# Patient Record
Sex: FEMALE | Race: WHITE | NOT HISPANIC OR LATINO | Employment: OTHER | URBAN - NONMETROPOLITAN AREA
[De-identification: names, ages, dates, MRNs, and addresses within clinical notes are randomized per-mention and may not be internally consistent; named-entity substitution may affect disease eponyms.]

---

## 2023-09-28 ENCOUNTER — TRANSCRIBE ORDERS (OUTPATIENT)
Dept: CARDIOLOGY | Facility: CLINIC | Age: 83
End: 2023-09-28
Payer: MEDICARE

## 2023-09-28 DIAGNOSIS — I44.0 AV BLOCK, 1ST DEGREE: ICD-10-CM

## 2023-09-28 DIAGNOSIS — R94.31 ABNORMAL EKG: ICD-10-CM

## 2023-09-28 PROBLEM — I49.9 IRREGULAR HEARTBEAT: Status: ACTIVE | Noted: 2023-09-28

## 2023-09-28 PROBLEM — E55.9 VITAMIN D DEFICIENCY: Status: ACTIVE | Noted: 2023-09-28

## 2023-09-28 PROBLEM — Z78.9 STATIN INTOLERANCE: Status: ACTIVE | Noted: 2023-09-28

## 2023-09-28 PROBLEM — I44.1 SECOND DEGREE AV BLOCK: Status: ACTIVE | Noted: 2023-09-28

## 2023-09-28 PROBLEM — J38.3 GLOTTIC INSUFFICIENCY: Status: ACTIVE | Noted: 2023-09-28

## 2023-09-28 PROBLEM — I80.8: Status: ACTIVE | Noted: 2023-09-28

## 2023-09-28 PROBLEM — E66.3 OVERWEIGHT WITH BODY MASS INDEX (BMI) OF 26 TO 26.9 IN ADULT: Status: ACTIVE | Noted: 2023-09-28

## 2023-09-28 PROBLEM — Z87.891 FORMER SMOKER: Status: ACTIVE | Noted: 2023-09-28

## 2023-09-28 PROBLEM — I10 ESSENTIAL HYPERTENSION: Status: ACTIVE | Noted: 2023-09-28

## 2023-09-28 PROBLEM — R09.A2 GLOBUS PHARYNGEUS: Status: ACTIVE | Noted: 2023-09-28

## 2023-09-28 PROBLEM — R49.0 MUSCLE TENSION DYSPHONIA: Status: ACTIVE | Noted: 2023-09-28

## 2023-09-28 PROBLEM — Z87.39 HISTORY OF ARTHRITIS: Status: ACTIVE | Noted: 2023-09-28

## 2023-09-28 PROBLEM — E78.5 HYPERLIPIDEMIA: Status: ACTIVE | Noted: 2023-09-28

## 2023-09-28 PROBLEM — I25.10 ATHEROSCLEROSIS OF NATIVE CORONARY ARTERY OF NATIVE HEART WITHOUT ANGINA PECTORIS: Status: ACTIVE | Noted: 2023-09-28

## 2023-09-28 PROBLEM — J38.02 BILATERAL VOCAL CORD PARESIS: Status: ACTIVE | Noted: 2023-09-28

## 2023-09-28 RX ORDER — CARVEDILOL 6.25 MG/1
1 TABLET ORAL 2 TIMES DAILY
COMMUNITY
Start: 2022-08-01 | End: 2023-11-27 | Stop reason: SINTOL

## 2023-09-28 RX ORDER — POTASSIUM CHLORIDE 20 MEQ/1
1 TABLET, EXTENDED RELEASE ORAL DAILY
COMMUNITY

## 2023-09-28 RX ORDER — ACETAMINOPHEN 500 MG
2 TABLET ORAL EVERY 8 HOURS PRN
COMMUNITY
Start: 2022-03-24

## 2023-09-28 RX ORDER — LANOLIN ALCOHOL/MO/W.PET/CERES
1 CREAM (GRAM) TOPICAL DAILY
COMMUNITY

## 2023-09-28 RX ORDER — ACETAMINOPHEN 500 MG
1 TABLET ORAL DAILY
COMMUNITY
End: 2024-03-25 | Stop reason: SDUPTHER

## 2023-09-28 RX ORDER — VITS A,C,E/LUTEIN/MINERALS 300MCG-200
1 TABLET ORAL DAILY
COMMUNITY

## 2023-09-28 RX ORDER — CHOLECALCIFEROL (VITAMIN D3) 50 MCG
1 TABLET ORAL DAILY
COMMUNITY
End: 2023-12-07 | Stop reason: SDUPTHER

## 2023-09-28 RX ORDER — EZETIMIBE 10 MG/1
1 TABLET ORAL NIGHTLY
COMMUNITY
Start: 2022-07-25

## 2023-09-28 RX ORDER — NITROGLYCERIN 0.4 MG/1
1 TABLET SUBLINGUAL EVERY 5 MIN PRN
COMMUNITY

## 2023-09-28 RX ORDER — HYDROCHLOROTHIAZIDE 12.5 MG/1
1 TABLET ORAL EVERY OTHER DAY
COMMUNITY
Start: 2022-10-24 | End: 2024-02-19 | Stop reason: SDUPTHER

## 2023-09-28 RX ORDER — ASPIRIN 81 MG/1
1 TABLET ORAL 3 TIMES WEEKLY
COMMUNITY

## 2023-09-28 RX ORDER — ACETAMINOPHEN 160 MG/5ML
1 SUSPENSION, ORAL (FINAL DOSE FORM) ORAL DAILY
COMMUNITY

## 2023-09-28 RX ORDER — LOPERAMIDE HYDROCHLORIDE 2 MG/1
1 CAPSULE ORAL 4 TIMES DAILY PRN
COMMUNITY

## 2023-09-28 RX ORDER — CALCIUM CARB, CITRATE, MALATE 250 MG
1 CAPSULE ORAL DAILY
COMMUNITY
End: 2024-03-25

## 2023-09-28 RX ORDER — AMLODIPINE AND BENAZEPRIL HYDROCHLORIDE 5; 20 MG/1; MG/1
1 CAPSULE ORAL DAILY
COMMUNITY
End: 2023-11-29

## 2023-09-28 RX ORDER — AMLODIPINE AND BENAZEPRIL HYDROCHLORIDE 10; 20 MG/1; MG/1
1 CAPSULE ORAL DAILY
COMMUNITY
End: 2023-12-07 | Stop reason: SDUPTHER

## 2023-10-03 ENCOUNTER — ANCILLARY PROCEDURE (OUTPATIENT)
Dept: CARDIOLOGY | Facility: CLINIC | Age: 83
End: 2023-10-03
Payer: MEDICARE

## 2023-10-03 DIAGNOSIS — R94.31 ABNORMAL EKG: ICD-10-CM

## 2023-10-03 DIAGNOSIS — I44.0 AV BLOCK, 1ST DEGREE: ICD-10-CM

## 2023-10-03 PROCEDURE — 93224 XTRNL ECG REC UP TO 48 HRS: CPT | Performed by: INTERNAL MEDICINE

## 2023-10-17 ENCOUNTER — TELEPHONE (OUTPATIENT)
Dept: CARDIOLOGY | Facility: CLINIC | Age: 83
End: 2023-10-17
Payer: MEDICARE

## 2023-10-17 NOTE — TELEPHONE ENCOUNTER
Result Communication    Resulted Orders   Holter Or Event Cardiac Monitor    Narrative    Patient underwent 24-hour Holter monitor.  Baseline rhythm is normal sinus   rhythm with average heart rate of 87 bpm.  It ranges from 63 to 133 bpm   there were a total of 489 isolated ventricular ectopic beats.  There were   a total of 3310 isolated supraventricular ectopic beats.  Intermittent   first-degree AV block and rare nonconducted sinus beats were seen.  No   significant tacky or bradycardia arrhythmia were seen.  No symptoms   entered by the patient    Conclusion    1.  Normal sinus rhythm  2.  Moderate volume isolated ventricular ectopic beats  3.  Frequent isolated supraventricular ectopic beats  4.  Intermittent first-degree AV block and rare nonconducted sinus beat  5.  No significant tacky or bradycardia arrhythmia  6.  No symptoms entered by the patient         6:19 PM      Results were successfully communicated with the patient and they acknowledged their understanding.

## 2023-10-17 NOTE — TELEPHONE ENCOUNTER
----- Message from Ayse Pereira MD sent at 10/17/2023  3:19 PM EDT -----  Let her know that the Holter monitor showed improvement of the heart rhythm  ----- Message -----  From: Marilia Nieto MD  Sent: 10/11/2023   5:12 PM EDT  To: Ayse Pereira MD

## 2023-11-27 DIAGNOSIS — R94.31 PROLONGED QT INTERVAL: ICD-10-CM

## 2023-11-27 DIAGNOSIS — I49.9 IRREGULAR HEARTBEAT: ICD-10-CM

## 2023-11-27 RX ORDER — PROPRANOLOL HYDROCHLORIDE 40 MG/1
40 TABLET ORAL DAILY
Qty: 90 TABLET | Refills: 3 | Status: CANCELLED | OUTPATIENT
Start: 2023-11-27 | End: 2024-11-26

## 2023-11-27 NOTE — TELEPHONE ENCOUNTER
Patient states that she has developed tremors and excessive fatigue. States that last couple of week she has noticed the symptoms daily and getting worse. Thinks that it is related to the medication adjustments (states taken off of Carvedilol). Fatigue is very bad even after a good night sleep. Advised that she should contact her PCP due to this, states that she does prefer to discuss with Dr. Ayse Pereira MD     To Dr. Ayse Pereira MD for review.

## 2023-11-27 NOTE — TELEPHONE ENCOUNTER
Spoke with patient advised of the above. She verbalized understanding. Transferred to scheduling  for EKG next Thursday and RX to Dr. Ayse Pereria MD for authorization to [facility].

## 2023-11-28 DIAGNOSIS — I10 ESSENTIAL HYPERTENSION: ICD-10-CM

## 2023-11-28 RX ORDER — PROPRANOLOL HYDROCHLORIDE 40 MG/1
40 TABLET ORAL DAILY
Qty: 90 TABLET | Refills: 3 | Status: SHIPPED | OUTPATIENT
Start: 2023-11-28 | End: 2023-12-12 | Stop reason: ALTCHOICE

## 2023-11-29 RX ORDER — AMLODIPINE AND BENAZEPRIL HYDROCHLORIDE 5; 20 MG/1; MG/1
1 CAPSULE ORAL DAILY
Qty: 90 CAPSULE | Refills: 3 | Status: SHIPPED | OUTPATIENT
Start: 2023-11-29

## 2023-12-07 ENCOUNTER — ANCILLARY PROCEDURE (OUTPATIENT)
Dept: CARDIOLOGY | Facility: CLINIC | Age: 83
End: 2023-12-07
Payer: MEDICARE

## 2023-12-07 VITALS
BODY MASS INDEX: 26.24 KG/M2 | HEIGHT: 61 IN | WEIGHT: 139 LBS | SYSTOLIC BLOOD PRESSURE: 138 MMHG | HEART RATE: 62 BPM | DIASTOLIC BLOOD PRESSURE: 82 MMHG

## 2023-12-07 DIAGNOSIS — I49.9 IRREGULAR HEARTBEAT: ICD-10-CM

## 2023-12-07 DIAGNOSIS — R94.31 PROLONGED QT INTERVAL: ICD-10-CM

## 2023-12-07 PROCEDURE — 93000 ELECTROCARDIOGRAM COMPLETE: CPT | Performed by: INTERNAL MEDICINE

## 2023-12-07 NOTE — PROGRESS NOTES
"Patient is here for an EKG visit ordered by Dr. Pereira due to irregular heartbeat and prolonged QT. Dr. Pereira in suite physician. Patient here due to recent medication changes. Patient started on Inderal 40mg once daily and decreased Amlodipine-Benazepril to 5/20mg once daily. Medication list Updated verbally with patient. Patient does complain of fatigue, but states that pounding heartbeat sensation and tremors have diminished. Discussed with Dana Boudreaux RN prior to discharge.     To Dr. Pereira for review        Vitals:    12/07/23 1117   BP: 138/82   BP Location: Left arm   Patient Position: Sitting   Pulse: 62   Weight: 63 kg (139 lb)   Height: 1.549 m (5' 1\")       "

## 2023-12-12 NOTE — PROGRESS NOTES
Phoned and spoke with patient. Advised to stop Inderal and need for EKG. Patient verbalized understanding.  Schedulers to arrange

## 2023-12-14 ENCOUNTER — ANCILLARY PROCEDURE (OUTPATIENT)
Dept: CARDIOLOGY | Facility: CLINIC | Age: 83
End: 2023-12-14
Payer: MEDICARE

## 2023-12-14 VITALS
WEIGHT: 141 LBS | HEART RATE: 83 BPM | BODY MASS INDEX: 26.62 KG/M2 | DIASTOLIC BLOOD PRESSURE: 74 MMHG | HEIGHT: 61 IN | SYSTOLIC BLOOD PRESSURE: 152 MMHG

## 2023-12-14 DIAGNOSIS — I49.9 IRREGULAR HEARTBEAT: ICD-10-CM

## 2023-12-14 DIAGNOSIS — R94.31 PROLONGED QT INTERVAL: ICD-10-CM

## 2023-12-14 PROCEDURE — 93000 ELECTROCARDIOGRAM COMPLETE: CPT | Performed by: INTERNAL MEDICINE

## 2023-12-14 RX ORDER — LANOLIN ALCOHOL/MO/W.PET/CERES
400 CREAM (GRAM) TOPICAL DAILY
Qty: 90 TABLET | Refills: 3 | Status: SHIPPED | OUTPATIENT
Start: 2023-12-14 | End: 2024-12-13

## 2023-12-14 NOTE — TELEPHONE ENCOUNTER
----- Message from RUEL Rowan sent at 12/14/2023 11:52 AM EST -----  Add magnesium oxide 400mg daily  Has the 'shakiness' been evalauted by PCP or Neuro??  She can't go back on inderal  To Dr. Pereira when he returns - he needs to know 12/7/2023 ECG patient was taking inderal and off coreg.     ----- Message -----  From: Meggan Sanches CMA  Sent: 12/14/2023   8:26 AM EST  To: RUEL Rowan; Ayse Pereira MD

## 2023-12-14 NOTE — PROGRESS NOTES
"Patient here for at EKG visit ordered by Dr. Pereira due to irrgular heart beat, prolonged QT interval. Dr. Wood in suite physician. Patient here due to medication change. Patient's Propranolol was discontinued. Medication list Updated verbally. Patient states that she can \"feel her heart beat\" and it's irregular. Her shakiness has returned. Patient has no other cardiac complaints at time of visit. Discussed with FRANKO Bodureaux RN prior to discharge.     To Dr. Pereira for review  To Carmen Alva NP for review in absence of Dr. Ayse Pereira MD            Vitals:    12/14/23 0807   BP: 152/74   BP Location: Right arm   Patient Position: Sitting   Pulse: 83   Weight: 64 kg (141 lb)   Height: 1.549 m (5' 1\")      EKG done in office today    "

## 2023-12-14 NOTE — TELEPHONE ENCOUNTER
Spoke with patient advised of the above. She is off the Indral and will not be taking this again. I have updated allergy list. She does note that the tremors started when she was trying to change the medications. She was off the coreg at 12/7 EKG.  I advised that I would send message to Dr. Ayse Pereira MD to review upon return and have him review EKG visit as well.     TO Dr. Ayse Pereira MD

## 2024-02-19 DIAGNOSIS — I10 ESSENTIAL HYPERTENSION: ICD-10-CM

## 2024-02-19 RX ORDER — HYDROCHLOROTHIAZIDE 12.5 MG/1
12.5 TABLET ORAL EVERY OTHER DAY
Qty: 45 TABLET | Refills: 3 | Status: SHIPPED | OUTPATIENT
Start: 2024-02-19 | End: 2024-03-25 | Stop reason: ALTCHOICE

## 2024-03-25 ENCOUNTER — OFFICE VISIT (OUTPATIENT)
Dept: CARDIOLOGY | Facility: CLINIC | Age: 84
End: 2024-03-25
Payer: MEDICARE

## 2024-03-25 VITALS
HEIGHT: 61 IN | BODY MASS INDEX: 25.6 KG/M2 | HEART RATE: 84 BPM | WEIGHT: 135.6 LBS | SYSTOLIC BLOOD PRESSURE: 160 MMHG | DIASTOLIC BLOOD PRESSURE: 70 MMHG

## 2024-03-25 DIAGNOSIS — Z87.891 FORMER SMOKER: ICD-10-CM

## 2024-03-25 DIAGNOSIS — I25.10 ATHEROSCLEROSIS OF NATIVE CORONARY ARTERY OF NATIVE HEART WITHOUT ANGINA PECTORIS: ICD-10-CM

## 2024-03-25 DIAGNOSIS — G25.1 TREMOR DUE TO MULTIPLE DRUGS: ICD-10-CM

## 2024-03-25 DIAGNOSIS — I44.1 SECOND DEGREE AV BLOCK: ICD-10-CM

## 2024-03-25 DIAGNOSIS — I10 ESSENTIAL HYPERTENSION: ICD-10-CM

## 2024-03-25 PROCEDURE — 1159F MED LIST DOCD IN RCRD: CPT | Performed by: INTERNAL MEDICINE

## 2024-03-25 PROCEDURE — 99214 OFFICE O/P EST MOD 30 MIN: CPT | Performed by: INTERNAL MEDICINE

## 2024-03-25 PROCEDURE — 3078F DIAST BP <80 MM HG: CPT | Performed by: INTERNAL MEDICINE

## 2024-03-25 PROCEDURE — 93000 ELECTROCARDIOGRAM COMPLETE: CPT | Performed by: INTERNAL MEDICINE

## 2024-03-25 PROCEDURE — 1036F TOBACCO NON-USER: CPT | Performed by: INTERNAL MEDICINE

## 2024-03-25 PROCEDURE — 3077F SYST BP >= 140 MM HG: CPT | Performed by: INTERNAL MEDICINE

## 2024-03-25 RX ORDER — INDAPAMIDE 2.5 MG/1
2.5 TABLET ORAL EVERY MORNING
Qty: 90 TABLET | Refills: 3 | Status: SHIPPED | OUTPATIENT
Start: 2024-03-25 | End: 2024-04-11 | Stop reason: DRUGHIGH

## 2024-03-25 RX ORDER — SPIRONOLACTONE 25 MG/1
25 TABLET ORAL DAILY
Qty: 90 TABLET | Refills: 3 | Status: SHIPPED | OUTPATIENT
Start: 2024-03-25 | End: 2025-03-25

## 2024-03-25 ASSESSMENT — ENCOUNTER SYMPTOMS: PALPITATIONS: 1

## 2024-03-25 NOTE — LETTER
March 25, 2024     Severiano Michaels DO  [address]  [facility] [address]    Patient: Leigh Reich   YOB: 1940   Date of Visit: 3/25/2024       Dear Dr. Severiano Michaels, DO:    Thank you for referring Leigh Reich to me for evaluation. Below are my notes for this consultation.  If you have questions, please do not hesitate to call me. I look forward to following your patient along with you.       Sincerely,     Ayse Pereira MD      CC: No Recipients  ______________________________________________________________________________________    Subjective   Leigh Reich is a 83 y.o. female       Chief Complaint    Follow-up          HPI   Patient is in the office for follow-up for the problems noted below.  She lost her  in January 2024 unfortunately and now she lives alone.  Since I saw her last time he had a Holter monitor that we did which revealed no indication of high-grade AV block since she was taken off of the beta-blockers.  EKG today confirmed normal sinus rhythm and normal heart rate with no conduction abnormalities.  She has been off of the beta-blocker for several months.  She has a tremor which I believe is essential tremor.  Will check her thyroid function though.  She reports no chest pain orthopnea PND or lower extremity edema.  Her blood pressure is not under control in the office and at home which was addressed as noted below.    ASSESSMENT AND PLAN:      1. Mild coronary artery disease with no prior interventions, she had 50% stenosis in the LAD by cardiac catheterization 2012. Stress test 2017 was normal Continue risk factor modification. The patient has been compliant with medical therapy. Baby aspirin to 3 times weekly was recommended  2. Hypertension. Currently not under control.  Will replace hydrochlorothiazide with indapamide 2.5 mg daily and add spironolactone 25 mg daily.  Will follow renal function and electrolytes and blood pressure  "check in few weeks.  3. Hyperlipidemia, currently on Zetia, LDL over 100 mg/dL. She is intolerant to statin therapy and had reaction to the bempedoic acid with severe itching  4 overweight. Encouraged more weight control and diet and exercise  5. History of deep vein thrombosis in the past with no pulmonary embolism. There has been no recurrences off of anticoagulation.  6.  History of type I second-degree AV block.  It resolved since the patient was taken off of the beta-blockers.     Ayse Pereira MD, [facility]   Review of Systems   Cardiovascular:  Positive for palpitations.   All other systems reviewed and are negative.           Vitals:    03/25/24 1507 03/25/24 1519   BP: 160/70    BP Location: Right arm    Patient Position: Sitting    Pulse: 76 84   Weight: 61.5 kg (135 lb 9.6 oz)    Height: 1.549 m (5' 1\")         Objective   Physical Exam  Constitutional:       Appearance: Normal appearance.   HENT:      Nose: Nose normal.   Neck:      Vascular: No carotid bruit.   Cardiovascular:      Rate and Rhythm: Normal rate.      Pulses: Normal pulses.      Heart sounds: Normal heart sounds.   Pulmonary:      Effort: Pulmonary effort is normal.   Abdominal:      General: Bowel sounds are normal.      Palpations: Abdomen is soft.   Musculoskeletal:         General: Normal range of motion.      Cervical back: Normal range of motion.      Right lower leg: No edema.      Left lower leg: No edema.   Skin:     General: Skin is warm and dry.   Neurological:      General: No focal deficit present.      Mental Status: She is alert.   Psychiatric:         Mood and Affect: Mood normal.         Behavior: Behavior normal.         Thought Content: Thought content normal.         Judgment: Judgment normal.         Allergies  Statins-hmg-coa reductase inhibitors and Sulfamethoxazole     Current Medications    Current Outpatient Medications:   •  acetaminophen (Tylenol) 500 mg tablet, Take 2 tablets (1,000 mg) by mouth every 8 hours " if needed (pain)., Disp: , Rfl:   •  amLODIPine-benazepriL (Lotrel) 5-20 mg capsule, TAKE ONE CAPSULE BY MOUTH DAILY, Disp: 90 capsule, Rfl: 3  •  aspirin 81 mg EC tablet, Take 1 tablet (81 mg) by mouth 3 (three) times a week., Disp: , Rfl:   •  CALCIUM CARBONATE-VITAMIN D3 ORAL, Take 1 tablet by mouth once daily., Disp: , Rfl:   •  coenzyme Q-10 200 mg capsule, Take 1 capsule (200 mg) by mouth once daily., Disp: , Rfl:   •  cyanocobalamin (Vitamin B-12) 1,000 mcg tablet, Take 1 tablet (1,000 mcg) by mouth once daily., Disp: , Rfl:   •  ezetimibe (Zetia) 10 mg tablet, Take 1 tablet (10 mg) by mouth once daily at bedtime., Disp: , Rfl:   •  loperamide (Imodium) 2 mg capsule, Take 1 capsule (2 mg) by mouth 4 times a day as needed., Disp: , Rfl:   •  magnesium oxide (Mag-Ox) 400 mg (241.3 mg magnesium) tablet, Take 1 tablet (400 mg) by mouth once daily., Disp: 90 tablet, Rfl: 3  •  multivitamin/iron/folic acid (CENTRUM ORAL), Take 1 tablet by mouth once daily., Disp: , Rfl:   •  nitroglycerin (Nitrostat) 0.4 mg SL tablet, Place 1 tablet (0.4 mg) under the tongue every 5 minutes if needed for chest pain. MAY REPEAT EVERY 5 MINUTES IF NEEDED, AFTER 3RD DOSE, CALL 911, Disp: , Rfl:   •  potassium chloride CR 20 mEq ER tablet, Take 1 tablet (20 mEq) by mouth once daily., Disp: , Rfl:   •  vit A,C and E-lutein-minerals (Ocuvite with Lutein) 300 mcg-200 mg-27 mg-2 mg tablet, Take 1 tablet by mouth once daily., Disp: , Rfl:   •  indapamide (Lozol) 2.5 mg tablet, Take 1 tablet (2.5 mg) by mouth once daily in the morning., Disp: 90 tablet, Rfl: 3  •  spironolactone (Aldactone) 25 mg tablet, Take 1 tablet (25 mg) by mouth once daily., Disp: 90 tablet, Rfl: 3                     Assessment/Plan   1. Atherosclerosis of native coronary artery of native heart without angina pectoris  Follow Up In Cardiology    Basic Metabolic Panel    Thyroid Stimulating Hormone    Lipid Panel    CBC    Basic Metabolic Panel    Thyroid Stimulating  Hormone    Lipid Panel    CBC      2. Essential hypertension  indapamide (Lozol) 2.5 mg tablet    spironolactone (Aldactone) 25 mg tablet    Basic Metabolic Panel    Follow Up In Cardiology    Basic Metabolic Panel      3. Second degree AV block  ECG 12 Lead    Thyroid Stimulating Hormone    Thyroid Stimulating Hormone      4. BMI 25.0-25.9,adult        5. Former smoker        6. 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     Provider Attestation - Scribe documentation    All medical record entries made by the Scribe were at my direction and personally dictated by me. I have reviewed the chart and agree that the record accurately reflects my personal performance of the history, physical exam, discussion and plan.

## 2024-03-25 NOTE — PATIENT INSTRUCTIONS
Please bring all medicines, vitamins, and herbal supplements with you when you come to the office.    Prescriptions will not be filled unless you are compliant with your follow up appointments or have a follow up appointment scheduled as per instruction of your physician. Refills should be requested at the time of your visit.     BMI was above normal measurement. Current weight: 61.5 kg (135 lb 9.6 oz)  Weight change since last visit (-) denotes wt loss -5.4 lbs   Weight loss needed to achieve BMI 25: 3.6 Lbs  Weight loss needed to achieve BMI 30: -22.8 Lbs  Provided instructions on dietary changes.

## 2024-03-25 NOTE — PROGRESS NOTES
Subjective   Leigh Reich is a 83 y.o. female       Chief Complaint    Follow-up          HPI   Patient is in the office for follow-up for the problems noted below.  She lost her  in January 2024 unfortunately and now she lives alone.  Since I saw her last time he had a Holter monitor that we did which revealed no indication of high-grade AV block since she was taken off of the beta-blockers.  EKG today confirmed normal sinus rhythm and normal heart rate with no conduction abnormalities.  She has been off of the beta-blocker for several months.  She has a tremor which I believe is essential tremor.  Will check her thyroid function though.  She reports no chest pain orthopnea PND or lower extremity edema.  Her blood pressure is not under control in the office and at home which was addressed as noted below.    ASSESSMENT AND PLAN:      1. Mild coronary artery disease with no prior interventions, she had 50% stenosis in the LAD by cardiac catheterization 2012. Stress test 2017 was normal Continue risk factor modification. The patient has been compliant with medical therapy. Baby aspirin to 3 times weekly was recommended  2. Hypertension. Currently not under control.  Will replace hydrochlorothiazide with indapamide 2.5 mg daily and add spironolactone 25 mg daily.  Will follow renal function and electrolytes and blood pressure check in few weeks.  3. Hyperlipidemia, currently on Zetia, LDL over 100 mg/dL. She is intolerant to statin therapy and had reaction to the bempedoic acid with severe itching  4 overweight. Encouraged more weight control and diet and exercise  5. History of deep vein thrombosis in the past with no pulmonary embolism. There has been no recurrences off of anticoagulation.  6.  History of type I second-degree AV block.  It resolved since the patient was taken off of the beta-blockers.     Ayse Pereira MD, [facility]   Review of Systems   Cardiovascular:  Positive for palpitations.   All  "other systems reviewed and are negative.           Vitals:    03/25/24 1507 03/25/24 1519   BP: 160/70    BP Location: Right arm    Patient Position: Sitting    Pulse: 76 84   Weight: 61.5 kg (135 lb 9.6 oz)    Height: 1.549 m (5' 1\")         Objective   Physical Exam  Constitutional:       Appearance: Normal appearance.   HENT:      Nose: Nose normal.   Neck:      Vascular: No carotid bruit.   Cardiovascular:      Rate and Rhythm: Normal rate.      Pulses: Normal pulses.      Heart sounds: Normal heart sounds.   Pulmonary:      Effort: Pulmonary effort is normal.   Abdominal:      General: Bowel sounds are normal.      Palpations: Abdomen is soft.   Musculoskeletal:         General: Normal range of motion.      Cervical back: Normal range of motion.      Right lower leg: No edema.      Left lower leg: No edema.   Skin:     General: Skin is warm and dry.   Neurological:      General: No focal deficit present.      Mental Status: She is alert.   Psychiatric:         Mood and Affect: Mood normal.         Behavior: Behavior normal.         Thought Content: Thought content normal.         Judgment: Judgment normal.         Allergies  Statins-hmg-coa reductase inhibitors and Sulfamethoxazole     Current Medications    Current Outpatient Medications:     acetaminophen (Tylenol) 500 mg tablet, Take 2 tablets (1,000 mg) by mouth every 8 hours if needed (pain)., Disp: , Rfl:     amLODIPine-benazepriL (Lotrel) 5-20 mg capsule, TAKE ONE CAPSULE BY MOUTH DAILY, Disp: 90 capsule, Rfl: 3    aspirin 81 mg EC tablet, Take 1 tablet (81 mg) by mouth 3 (three) times a week., Disp: , Rfl:     CALCIUM CARBONATE-VITAMIN D3 ORAL, Take 1 tablet by mouth once daily., Disp: , Rfl:     coenzyme Q-10 200 mg capsule, Take 1 capsule (200 mg) by mouth once daily., Disp: , Rfl:     cyanocobalamin (Vitamin B-12) 1,000 mcg tablet, Take 1 tablet (1,000 mcg) by mouth once daily., Disp: , Rfl:     ezetimibe (Zetia) 10 mg tablet, Take 1 tablet (10 mg) " by mouth once daily at bedtime., Disp: , Rfl:     loperamide (Imodium) 2 mg capsule, Take 1 capsule (2 mg) by mouth 4 times a day as needed., Disp: , Rfl:     magnesium oxide (Mag-Ox) 400 mg (241.3 mg magnesium) tablet, Take 1 tablet (400 mg) by mouth once daily., Disp: 90 tablet, Rfl: 3    multivitamin/iron/folic acid (CENTRUM ORAL), Take 1 tablet by mouth once daily., Disp: , Rfl:     nitroglycerin (Nitrostat) 0.4 mg SL tablet, Place 1 tablet (0.4 mg) under the tongue every 5 minutes if needed for chest pain. MAY REPEAT EVERY 5 MINUTES IF NEEDED, AFTER 3RD DOSE, CALL 911, Disp: , Rfl:     potassium chloride CR 20 mEq ER tablet, Take 1 tablet (20 mEq) by mouth once daily., Disp: , Rfl:     vit A,C and E-lutein-minerals (Ocuvite with Lutein) 300 mcg-200 mg-27 mg-2 mg tablet, Take 1 tablet by mouth once daily., Disp: , Rfl:     indapamide (Lozol) 2.5 mg tablet, Take 1 tablet (2.5 mg) by mouth once daily in the morning., Disp: 90 tablet, Rfl: 3    spironolactone (Aldactone) 25 mg tablet, Take 1 tablet (25 mg) by mouth once daily., Disp: 90 tablet, Rfl: 3                     Assessment/Plan   1. Atherosclerosis of native coronary artery of native heart without angina pectoris  Follow Up In Cardiology    Basic Metabolic Panel    Thyroid Stimulating Hormone    Lipid Panel    CBC    Basic Metabolic Panel    Thyroid Stimulating Hormone    Lipid Panel    CBC      2. Essential hypertension  indapamide (Lozol) 2.5 mg tablet    spironolactone (Aldactone) 25 mg tablet    Basic Metabolic Panel    Follow Up In Cardiology    Basic Metabolic Panel      3. Second degree AV block  ECG 12 Lead    Thyroid Stimulating Hormone    Thyroid Stimulating Hormone      4. BMI 25.0-25.9,adult        5. Former smoker        6. 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     Provider Attestation - Scribe documentation    All medical record entries made by the Scribe were at my direction and personally dictated by me. I have reviewed the chart and agree that the record accurately reflects my personal performance of the history, physical exam, discussion and plan.

## 2024-04-11 ENCOUNTER — OFFICE VISIT (OUTPATIENT)
Dept: CARDIOLOGY | Facility: CLINIC | Age: 84
End: 2024-04-11
Payer: MEDICARE

## 2024-04-11 VITALS
HEIGHT: 61 IN | SYSTOLIC BLOOD PRESSURE: 136 MMHG | HEART RATE: 76 BPM | WEIGHT: 131 LBS | DIASTOLIC BLOOD PRESSURE: 78 MMHG | BODY MASS INDEX: 24.73 KG/M2

## 2024-04-11 DIAGNOSIS — I10 ESSENTIAL HYPERTENSION: ICD-10-CM

## 2024-04-11 DIAGNOSIS — I25.10 ATHEROSCLEROSIS OF NATIVE CORONARY ARTERY OF NATIVE HEART WITHOUT ANGINA PECTORIS: ICD-10-CM

## 2024-04-11 PROCEDURE — 1159F MED LIST DOCD IN RCRD: CPT | Performed by: INTERNAL MEDICINE

## 2024-04-11 PROCEDURE — 3078F DIAST BP <80 MM HG: CPT | Performed by: INTERNAL MEDICINE

## 2024-04-11 PROCEDURE — 1036F TOBACCO NON-USER: CPT | Performed by: INTERNAL MEDICINE

## 2024-04-11 PROCEDURE — 3075F SYST BP GE 130 - 139MM HG: CPT | Performed by: INTERNAL MEDICINE

## 2024-04-11 PROCEDURE — 99212 OFFICE O/P EST SF 10 MIN: CPT | Performed by: INTERNAL MEDICINE

## 2024-04-11 RX ORDER — INDAPAMIDE 1.25 MG/1
1.25 TABLET ORAL EVERY MORNING
Qty: 90 TABLET | Refills: 3 | Status: SHIPPED | OUTPATIENT
Start: 2024-04-11 | End: 2025-04-11

## 2024-04-11 ASSESSMENT — ENCOUNTER SYMPTOMS: HYPERTENSION: 1

## 2024-04-11 NOTE — PATIENT INSTRUCTIONS
Please bring all medicines, vitamins, and herbal supplements with you when you come to the office.    Prescriptions will not be filled unless you are compliant with your follow up appointments or have a follow up appointment scheduled as per instruction of your physician. Refills should be requested at the time of your visit.     9/26 as scheduled.  Reduce Lozol  Chem 6 2 weeks  Cardiac rehab [place]

## 2024-04-11 NOTE — LETTER
"April 11, 2024     Severiano Michaels DO  [address]  [facility] [address]    Patient: Leigh Reich   YOB: 1940   Date of Visit: 4/11/2024       Dear Dr. Severiano Michaels, DO:    Thank you for referring Leigh Reich to me for evaluation. Below are my notes for this consultation.  If you have questions, please do not hesitate to call me. I look forward to following your patient along with you.       Sincerely,     Ayse Pereira MD      CC: No Recipients  ______________________________________________________________________________________    Subjective   Leigh Reich is a 83 y.o. female       Chief Complaint    Hypertension          Hypertension    Patient is in the office for hypertension management.  Blood pressure is completely under control on the changes made last visit by adding indapamide and spironolactone.  She had no side effect but her kidney function worsened and creatinine increased to 1.5 mg/dL.  Because of this we will reduce indapamide down to 1.25 mg daily and repeat basic metabolic profile in 2 weeks.  She was advised to stay hydrated and avoid NSAIDs    Review of Systems   All other systems reviewed and are negative.           Vitals:    04/11/24 0932 04/11/24 0935   BP: 134/82 136/78   BP Location: Left arm Right arm   Patient Position: Sitting Sitting   Pulse: 76    Weight: 59.4 kg (131 lb)    Height: 1.549 m (5' 1\")         Objective   Physical Exam    Allergies  Statins-hmg-coa reductase inhibitors and Sulfamethoxazole     Current Medications    Current Outpatient Medications:   •  acetaminophen (Tylenol) 500 mg tablet, Take 2 tablets (1,000 mg) by mouth every 8 hours if needed (pain)., Disp: , Rfl:   •  amLODIPine-benazepriL (Lotrel) 5-20 mg capsule, TAKE ONE CAPSULE BY MOUTH DAILY, Disp: 90 capsule, Rfl: 3  •  aspirin 81 mg EC tablet, Take 1 tablet (81 mg) by mouth 3 (three) times a week., Disp: , Rfl:   •  CALCIUM CARBONATE-VITAMIN D3 ORAL, " Take 1 tablet by mouth once daily., Disp: , Rfl:   •  coenzyme Q-10 200 mg capsule, Take 1 capsule (200 mg) by mouth once daily., Disp: , Rfl:   •  cyanocobalamin (Vitamin B-12) 1,000 mcg tablet, Take 1 tablet (1,000 mcg) by mouth once daily., Disp: , Rfl:   •  ezetimibe (Zetia) 10 mg tablet, Take 1 tablet (10 mg) by mouth once daily at bedtime., Disp: , Rfl:   •  indapamide (Lozol) 2.5 mg tablet, Take 1 tablet (2.5 mg) by mouth once daily in the morning., Disp: 90 tablet, Rfl: 3  •  loperamide (Imodium) 2 mg capsule, Take 1 capsule (2 mg) by mouth 4 times a day as needed., Disp: , Rfl:   •  magnesium oxide (Mag-Ox) 400 mg (241.3 mg magnesium) tablet, Take 1 tablet (400 mg) by mouth once daily., Disp: 90 tablet, Rfl: 3  •  multivitamin/iron/folic acid (CENTRUM ORAL), Take 1 tablet by mouth once daily., Disp: , Rfl:   •  nitroglycerin (Nitrostat) 0.4 mg SL tablet, Place 1 tablet (0.4 mg) under the tongue every 5 minutes if needed for chest pain. MAY REPEAT EVERY 5 MINUTES IF NEEDED, AFTER 3RD DOSE, CALL 911, Disp: , Rfl:   •  potassium chloride CR 20 mEq ER tablet, Take 1 tablet (20 mEq) by mouth once daily., Disp: , Rfl:   •  spironolactone (Aldactone) 25 mg tablet, Take 1 tablet (25 mg) by mouth once daily., Disp: 90 tablet, Rfl: 3  •  vit A,C and E-lutein-minerals (Ocuvite with Lutein) 300 mcg-200 mg-27 mg-2 mg tablet, Take 1 tablet by mouth once daily., Disp: , Rfl:                      Assessment/Plan   1. Essential hypertension  Follow Up In Cardiology               Scribe Attestation  By signing my name below, Sue RAMAN LPN, Scribe   attest that this documentation has been prepared under the direction and in the presence of Ayse Pereira MD.     Provider Attestation - Scribe documentation    All medical record entries made by the Scribe were at my direction and personally dictated by me. I have reviewed the chart and agree that the record accurately reflects my personal performance of the history,  physical exam, discussion and plan.

## 2024-04-11 NOTE — PROGRESS NOTES
"Subjective   Leigh Reich is a 83 y.o. female       Chief Complaint    Hypertension          Hypertension    Patient is in the office for hypertension management.  Blood pressure is completely under control on the changes made last visit by adding indapamide and spironolactone.  She had no side effect but her kidney function worsened and creatinine increased to 1.5 mg/dL.  Because of this we will reduce indapamide down to 1.25 mg daily and repeat basic metabolic profile in 2 weeks.  She was advised to stay hydrated and avoid NSAIDs    Review of Systems   All other systems reviewed and are negative.           Vitals:    04/11/24 0932 04/11/24 0935   BP: 134/82 136/78   BP Location: Left arm Right arm   Patient Position: Sitting Sitting   Pulse: 76    Weight: 59.4 kg (131 lb)    Height: 1.549 m (5' 1\")         Objective   Physical Exam    Allergies  Statins-hmg-coa reductase inhibitors and Sulfamethoxazole     Current Medications    Current Outpatient Medications:     acetaminophen (Tylenol) 500 mg tablet, Take 2 tablets (1,000 mg) by mouth every 8 hours if needed (pain)., Disp: , Rfl:     amLODIPine-benazepriL (Lotrel) 5-20 mg capsule, TAKE ONE CAPSULE BY MOUTH DAILY, Disp: 90 capsule, Rfl: 3    aspirin 81 mg EC tablet, Take 1 tablet (81 mg) by mouth 3 (three) times a week., Disp: , Rfl:     CALCIUM CARBONATE-VITAMIN D3 ORAL, Take 1 tablet by mouth once daily., Disp: , Rfl:     coenzyme Q-10 200 mg capsule, Take 1 capsule (200 mg) by mouth once daily., Disp: , Rfl:     cyanocobalamin (Vitamin B-12) 1,000 mcg tablet, Take 1 tablet (1,000 mcg) by mouth once daily., Disp: , Rfl:     ezetimibe (Zetia) 10 mg tablet, Take 1 tablet (10 mg) by mouth once daily at bedtime., Disp: , Rfl:     indapamide (Lozol) 2.5 mg tablet, Take 1 tablet (2.5 mg) by mouth once daily in the morning., Disp: 90 tablet, Rfl: 3    loperamide (Imodium) 2 mg capsule, Take 1 capsule (2 mg) by mouth 4 times a day as needed., Disp: , Rfl:     " magnesium oxide (Mag-Ox) 400 mg (241.3 mg magnesium) tablet, Take 1 tablet (400 mg) by mouth once daily., Disp: 90 tablet, Rfl: 3    multivitamin/iron/folic acid (CENTRUM ORAL), Take 1 tablet by mouth once daily., Disp: , Rfl:     nitroglycerin (Nitrostat) 0.4 mg SL tablet, Place 1 tablet (0.4 mg) under the tongue every 5 minutes if needed for chest pain. MAY REPEAT EVERY 5 MINUTES IF NEEDED, AFTER 3RD DOSE, CALL 911, Disp: , Rfl:     potassium chloride CR 20 mEq ER tablet, Take 1 tablet (20 mEq) by mouth once daily., Disp: , Rfl:     spironolactone (Aldactone) 25 mg tablet, Take 1 tablet (25 mg) by mouth once daily., Disp: 90 tablet, Rfl: 3    vit A,C and E-lutein-minerals (Ocuvite with Lutein) 300 mcg-200 mg-27 mg-2 mg tablet, Take 1 tablet by mouth once daily., Disp: , Rfl:                      Assessment/Plan   1. Essential hypertension  Follow Up In Cardiology               Scribe Attestation  By signing my name below, Sue RAMAN LPN, Scribe   attest that this documentation has been prepared under the direction and in the presence of Ayse Pereira MD.     Provider Attestation - Scribe documentation    All medical record entries made by the Scribe were at my direction and personally dictated by me. I have reviewed the chart and agree that the record accurately reflects my personal performance of the history, physical exam, discussion and plan.

## 2024-05-08 ENCOUNTER — TELEPHONE (OUTPATIENT)
Dept: CARDIOLOGY | Facility: CLINIC | Age: 84
End: 2024-05-08
Payer: MEDICARE

## 2024-05-08 NOTE — TELEPHONE ENCOUNTER
Patient phoned states she discontinued the indapamide 1.25mg,  spironolactone 25mg she has been very fatigued with BP readings ranging 116/59 HR 70. After stopping medication x1 week ago BP readings ranging 121//69 HR 79. She is continuing with cardiac rehab, readings taken there. Patient is continuing to take Lotrel, states she feels much better just taking that. Inquiring if further medication adjustments are needed. Please advise.    To Dr. Ayse Pereira MD for review.

## 2024-07-15 DIAGNOSIS — E78.5 HYPERLIPIDEMIA, UNSPECIFIED HYPERLIPIDEMIA TYPE: ICD-10-CM

## 2024-07-15 RX ORDER — EZETIMIBE 10 MG/1
10 TABLET ORAL NIGHTLY
Qty: 90 TABLET | Refills: 0 | Status: SHIPPED | OUTPATIENT
Start: 2024-07-15

## 2024-09-26 ENCOUNTER — APPOINTMENT (OUTPATIENT)
Dept: CARDIOLOGY | Facility: CLINIC | Age: 84
End: 2024-09-26
Payer: MEDICARE

## 2024-09-26 VITALS
HEART RATE: 62 BPM | BODY MASS INDEX: 24.55 KG/M2 | DIASTOLIC BLOOD PRESSURE: 68 MMHG | WEIGHT: 130 LBS | SYSTOLIC BLOOD PRESSURE: 120 MMHG | HEIGHT: 61 IN

## 2024-09-26 DIAGNOSIS — R53.83 OTHER FATIGUE: ICD-10-CM

## 2024-09-26 DIAGNOSIS — I10 ESSENTIAL HYPERTENSION: Primary | ICD-10-CM

## 2024-09-26 DIAGNOSIS — I80.8: ICD-10-CM

## 2024-09-26 DIAGNOSIS — Z87.891 FORMER SMOKER: ICD-10-CM

## 2024-09-26 DIAGNOSIS — I25.10 ATHEROSCLEROSIS OF NATIVE CORONARY ARTERY OF NATIVE HEART WITHOUT ANGINA PECTORIS: ICD-10-CM

## 2024-09-26 DIAGNOSIS — R94.31 PROLONGED QT INTERVAL: ICD-10-CM

## 2024-09-26 DIAGNOSIS — E78.5 HYPERLIPIDEMIA, UNSPECIFIED HYPERLIPIDEMIA TYPE: ICD-10-CM

## 2024-09-26 DIAGNOSIS — R55 SYNCOPE AND COLLAPSE: ICD-10-CM

## 2024-09-26 DIAGNOSIS — Z78.9 STATIN INTOLERANCE: ICD-10-CM

## 2024-09-26 PROCEDURE — 99214 OFFICE O/P EST MOD 30 MIN: CPT | Performed by: INTERNAL MEDICINE

## 2024-09-26 PROCEDURE — 1159F MED LIST DOCD IN RCRD: CPT | Performed by: INTERNAL MEDICINE

## 2024-09-26 PROCEDURE — 3074F SYST BP LT 130 MM HG: CPT | Performed by: INTERNAL MEDICINE

## 2024-09-26 PROCEDURE — 93000 ELECTROCARDIOGRAM COMPLETE: CPT | Performed by: INTERNAL MEDICINE

## 2024-09-26 PROCEDURE — 1036F TOBACCO NON-USER: CPT | Performed by: INTERNAL MEDICINE

## 2024-09-26 PROCEDURE — 3078F DIAST BP <80 MM HG: CPT | Performed by: INTERNAL MEDICINE

## 2024-09-26 RX ORDER — PROPRANOLOL HYDROCHLORIDE 10 MG/1
10 TABLET ORAL 2 TIMES DAILY
COMMUNITY
Start: 2024-09-15

## 2024-09-26 RX ORDER — AMLODIPINE AND BENAZEPRIL HYDROCHLORIDE 5; 10 MG/1; MG/1
1 CAPSULE ORAL DAILY
Qty: 90 CAPSULE | Refills: 3 | Status: SHIPPED | OUTPATIENT
Start: 2024-09-26 | End: 2025-09-26

## 2024-09-26 RX ORDER — BACLOFEN 20 MG
TABLET ORAL 2 TIMES DAILY
COMMUNITY

## 2024-09-26 ASSESSMENT — ENCOUNTER SYMPTOMS: SHORTNESS OF BREATH: 1

## 2024-09-26 NOTE — LETTER
September 26, 2024     Severiano Michaels DO  [address] [facility] [address] [facility]  [place] [address]    Patient: Leigh Reich   YOB: 1940   Date of Visit: 9/26/2024       Dear Dr. Severiano Michaels, DO:    Thank you for referring Leigh Reich to me for evaluation. Below are my notes for this consultation.  If you have questions, please do not hesitate to call me. I look forward to following your patient along with you.       Sincerely,     Ayse Pereira MD      CC: No Recipients  ______________________________________________________________________________________    Subjective   Leigh Reich is a 83 y.o. female       Chief Complaint    Follow-up          HPI   Patient is in the office for follow-up for the problems noted below.  In July 2024 she had syncopal event early in the morning as she was going to the bathroom requiring a stay overnight at [facility] with negative workup.  It appears to be either hypotension or vasovagal event, her only complaint is fatigue.  She is benefiting from propranolol in regard to her hand shaking and tremor.  Blood pressure is under control but seems to be at times on the lower side and because of that the dose of Lotrel will be reduced.  Lab data from the recent admission to [facility] were requested.  EKG today revealed normal sinus rhythm with sinus bradycardia and first-degree AV block.  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    ASSESSMENT AND PLAN:      1. Mild coronary artery disease with no prior interventions, she had 50% stenosis in the LAD by cardiac catheterization 2012. Stress test 2017 was normal Continue risk factor modification. The patient has been compliant with medical therapy.    2.  Essential hypertension.  Due to low blood pressure reading at home and recent syncope I will reduce the Lotrel down to 5/10 mg daily..  3. Hyperlipidemia, currently on Zetia, LDL over 100 mg/dL. She is  "intolerant to statin therapy and had reaction to the bempedoic acid with severe itching  4. generalized fatigue, multifactorial and inconsequential.  5. History of deep vein thrombosis in the past with no pulmonary embolism. There has been no recurrences off of anticoagulation.  6.  Essential tremor on propranolol with benefits and improvement  Review of Systems   Constitutional: Positive for malaise/fatigue.   Respiratory:  Positive for shortness of breath.    All other systems reviewed and are negative.           Vitals:    09/26/24 1004   BP: 120/68   BP Location: Left arm   Patient Position: Sitting   Pulse: 62   Weight: 59 kg (130 lb)   Height: 1.549 m (5' 1\")        Objective   Physical Exam  Constitutional:       Appearance: Normal appearance.   HENT:      Nose: Nose normal.   Neck:      Vascular: No carotid bruit.   Cardiovascular:      Rate and Rhythm: Normal rate.      Pulses: Normal pulses.      Heart sounds: Normal heart sounds.   Pulmonary:      Effort: Pulmonary effort is normal.   Abdominal:      General: Bowel sounds are normal.      Palpations: Abdomen is soft.   Musculoskeletal:         General: Normal range of motion.      Cervical back: Normal range of motion.      Right lower leg: No edema.      Left lower leg: No edema.   Skin:     General: Skin is warm and dry.   Neurological:      General: No focal deficit present.      Mental Status: She is alert.   Psychiatric:         Mood and Affect: Mood normal.         Behavior: Behavior normal.         Thought Content: Thought content normal.         Judgment: Judgment normal.         Allergies  Statins-hmg-coa reductase inhibitors and Sulfamethoxazole     Current Medications    Current Outpatient Medications:   •  acetaminophen (Tylenol) 500 mg tablet, Take 2 tablets (1,000 mg) by mouth every 8 hours if needed (pain)., Disp: , Rfl:   •  CALCIUM CARBONATE-VITAMIN D3 ORAL, Take 1 tablet by mouth once daily., Disp: , Rfl:   •  coenzyme Q-10 200 mg " capsule, Take 1 capsule (200 mg) by mouth once daily., Disp: , Rfl:   •  cyanocobalamin (Vitamin B-12) 1,000 mcg tablet, Take 1 tablet (1,000 mcg) by mouth once daily., Disp: , Rfl:   •  ezetimibe (Zetia) 10 mg tablet, TAKE 1 TABLET BY MOUTH AT BEDTIME, Disp: 90 tablet, Rfl: 0  •  indapamide (Lozol) 1.25 mg tablet, Take 1 tablet (1.25 mg) by mouth once daily in the morning., Disp: 90 tablet, Rfl: 3  •  loperamide (Imodium) 2 mg capsule, Take 1 capsule (2 mg) by mouth 4 times a day as needed., Disp: , Rfl:   •  magnesium oxide 500 mg magnesium tablet, Take by mouth 2 times a day., Disp: , Rfl:   •  multivitamin/iron/folic acid (CENTRUM ORAL), Take 1 tablet by mouth once daily., Disp: , Rfl:   •  nitroglycerin (Nitrostat) 0.4 mg SL tablet, Place 1 tablet (0.4 mg) under the tongue every 5 minutes if needed for chest pain. MAY REPEAT EVERY 5 MINUTES IF NEEDED, AFTER 3RD DOSE, CALL 911, Disp: , Rfl:   •  potassium chloride CR 20 mEq ER tablet, Take 1 tablet (20 mEq) by mouth once daily., Disp: , Rfl:   •  propranolol (Inderal) 10 mg tablet, Take 1 tablet (10 mg) by mouth 2 times a day., Disp: , Rfl:   •  spironolactone (Aldactone) 25 mg tablet, Take 1 tablet (25 mg) by mouth once daily., Disp: 90 tablet, Rfl: 3  •  vit A,C and E-lutein-minerals (Ocuvite with Lutein) 300 mcg-200 mg-27 mg-2 mg tablet, Take 1 tablet by mouth once daily., Disp: , Rfl:   •  amLODIPine-benazepriL (LotreL) 5-10 mg capsule, Take 1 capsule by mouth once daily., Disp: 90 capsule, Rfl: 3  •  magnesium oxide (Mag-Ox) 400 mg (241.3 mg magnesium) tablet, Take 1 tablet (400 mg) by mouth once daily., Disp: 90 tablet, Rfl: 3                     Assessment/Plan   1. Atherosclerosis of native coronary artery of native heart without angina pectoris  Follow Up In Cardiology    Follow Up In Cardiology    amLODIPine-benazepriL (LotreL) 5-10 mg capsule      2. Second degree AV block  ECG 12 Lead      3. Prolonged QT interval  ECG 12 Lead      4. Essential  hypertension  amLODIPine-benazepriL (LotreL) 5-10 mg capsule      5. Hyperlipidemia, unspecified hyperlipidemia type        6. Statin intolerance        7. Thrombophlebitis of deep vein of upper extremity        8. Former smoker        9. BMI 24.0-24.9, adult                 Scribe Attestation  By signing my name below, Sue RAMAN LPN, Scribe   attest that this documentation has been prepared under the direction and in the presence of Ayse Pereira MD.     Provider Attestation - Scribe documentation    All medical record entries made by the Scribe were at my direction and personally dictated by me. I have reviewed the chart and agree that the record accurately reflects my personal performance of the history, physical exam, discussion and plan.

## 2024-09-26 NOTE — PATIENT INSTRUCTIONS
Please bring all medicines, vitamins, and herbal supplements with you when you come to the office.    Prescriptions will not be filled unless you are compliant with your follow up appointments or have a follow up appointment scheduled as per instruction of your physician. Refills should be requested at the time of your visit.     Reduce lotrel

## 2024-09-26 NOTE — PROGRESS NOTES
Subjective   Leigh Reich is a 83 y.o. female       Chief Complaint    Follow-up          HPI   Patient is in the office for follow-up for the problems noted below.  In July 2024 she had syncopal event early in the morning as she was going to the bathroom requiring a stay overnight at [facility] with negative workup.  It appears to be either hypotension or vasovagal event, her only complaint is fatigue.  She is benefiting from propranolol in regard to her hand shaking and tremor.  Blood pressure is under control but seems to be at times on the lower side and because of that the dose of Lotrel will be reduced.  Lab data from the recent admission to [facility] were requested.  EKG today revealed normal sinus rhythm with sinus bradycardia and first-degree AV block.  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    ASSESSMENT AND PLAN:      1. Mild coronary artery disease with no prior interventions, she had 50% stenosis in the LAD by cardiac catheterization 2012. Stress test 2017 was normal Continue risk factor modification. The patient has been compliant with medical therapy.    2.  Essential hypertension.  Due to low blood pressure reading at home and recent syncope I will reduce the Lotrel down to 5/10 mg daily..  3. Hyperlipidemia, currently on Zetia, LDL over 100 mg/dL. She is intolerant to statin therapy and had reaction to the bempedoic acid with severe itching  4. generalized fatigue, multifactorial and inconsequential.  5. History of deep vein thrombosis in the past with no pulmonary embolism. There has been no recurrences off of anticoagulation.  6.  Essential tremor on propranolol with benefits and improvement  Review of Systems   Constitutional: Positive for malaise/fatigue.   Respiratory:  Positive for shortness of breath.    All other systems reviewed and are negative.           Vitals:    09/26/24 1004   BP: 120/68   BP Location: Left arm   Patient  "Position: Sitting   Pulse: 62   Weight: 59 kg (130 lb)   Height: 1.549 m (5' 1\")        Objective   Physical Exam  Constitutional:       Appearance: Normal appearance.   HENT:      Nose: Nose normal.   Neck:      Vascular: No carotid bruit.   Cardiovascular:      Rate and Rhythm: Normal rate.      Pulses: Normal pulses.      Heart sounds: Normal heart sounds.   Pulmonary:      Effort: Pulmonary effort is normal.   Abdominal:      General: Bowel sounds are normal.      Palpations: Abdomen is soft.   Musculoskeletal:         General: Normal range of motion.      Cervical back: Normal range of motion.      Right lower leg: No edema.      Left lower leg: No edema.   Skin:     General: Skin is warm and dry.   Neurological:      General: No focal deficit present.      Mental Status: She is alert.   Psychiatric:         Mood and Affect: Mood normal.         Behavior: Behavior normal.         Thought Content: Thought content normal.         Judgment: Judgment normal.         Allergies  Statins-hmg-coa reductase inhibitors and Sulfamethoxazole     Current Medications    Current Outpatient Medications:     acetaminophen (Tylenol) 500 mg tablet, Take 2 tablets (1,000 mg) by mouth every 8 hours if needed (pain)., Disp: , Rfl:     CALCIUM CARBONATE-VITAMIN D3 ORAL, Take 1 tablet by mouth once daily., Disp: , Rfl:     coenzyme Q-10 200 mg capsule, Take 1 capsule (200 mg) by mouth once daily., Disp: , Rfl:     cyanocobalamin (Vitamin B-12) 1,000 mcg tablet, Take 1 tablet (1,000 mcg) by mouth once daily., Disp: , Rfl:     ezetimibe (Zetia) 10 mg tablet, TAKE 1 TABLET BY MOUTH AT BEDTIME, Disp: 90 tablet, Rfl: 0    indapamide (Lozol) 1.25 mg tablet, Take 1 tablet (1.25 mg) by mouth once daily in the morning., Disp: 90 tablet, Rfl: 3    loperamide (Imodium) 2 mg capsule, Take 1 capsule (2 mg) by mouth 4 times a day as needed., Disp: , Rfl:     magnesium oxide 500 mg magnesium tablet, Take by mouth 2 times a day., Disp: , Rfl:     " multivitamin/iron/folic acid (CENTRUM ORAL), Take 1 tablet by mouth once daily., Disp: , Rfl:     nitroglycerin (Nitrostat) 0.4 mg SL tablet, Place 1 tablet (0.4 mg) under the tongue every 5 minutes if needed for chest pain. MAY REPEAT EVERY 5 MINUTES IF NEEDED, AFTER 3RD DOSE, CALL 911, Disp: , Rfl:     potassium chloride CR 20 mEq ER tablet, Take 1 tablet (20 mEq) by mouth once daily., Disp: , Rfl:     propranolol (Inderal) 10 mg tablet, Take 1 tablet (10 mg) by mouth 2 times a day., Disp: , Rfl:     spironolactone (Aldactone) 25 mg tablet, Take 1 tablet (25 mg) by mouth once daily., Disp: 90 tablet, Rfl: 3    vit A,C and E-lutein-minerals (Ocuvite with Lutein) 300 mcg-200 mg-27 mg-2 mg tablet, Take 1 tablet by mouth once daily., Disp: , Rfl:     amLODIPine-benazepriL (LotreL) 5-10 mg capsule, Take 1 capsule by mouth once daily., Disp: 90 capsule, Rfl: 3    magnesium oxide (Mag-Ox) 400 mg (241.3 mg magnesium) tablet, Take 1 tablet (400 mg) by mouth once daily., Disp: 90 tablet, Rfl: 3                     Assessment/Plan   1. Atherosclerosis of native coronary artery of native heart without angina pectoris  Follow Up In Cardiology    Follow Up In Cardiology    amLODIPine-benazepriL (LotreL) 5-10 mg capsule      2. Second degree AV block  ECG 12 Lead      3. Prolonged QT interval  ECG 12 Lead      4. Essential hypertension  amLODIPine-benazepriL (LotreL) 5-10 mg capsule      5. Hyperlipidemia, unspecified hyperlipidemia type        6. Statin intolerance        7. Thrombophlebitis of deep vein of upper extremity        8. Former smoker        9. BMI 24.0-24.9, adult                 Scribe Attestation  By signing my name below, ISue LPN, Scribe   attest that this documentation has been prepared under the direction and in the presence of Ayse Pereira MD.     Provider Attestation - Scribe documentation    All medical record entries made by the Scribe were at my direction and personally dictated by me. I  have reviewed the chart and agree that the record accurately reflects my personal performance of the history, physical exam, discussion and plan.

## 2024-10-14 DIAGNOSIS — E78.5 HYPERLIPIDEMIA, UNSPECIFIED HYPERLIPIDEMIA TYPE: ICD-10-CM

## 2024-10-14 RX ORDER — EZETIMIBE 10 MG/1
10 TABLET ORAL NIGHTLY
Qty: 90 TABLET | Refills: 3 | Status: SHIPPED | OUTPATIENT
Start: 2024-10-14 | End: 2025-10-14

## 2025-04-29 ENCOUNTER — APPOINTMENT (OUTPATIENT)
Dept: CARDIOLOGY | Facility: CLINIC | Age: 85
End: 2025-04-29
Payer: MEDICARE

## 2025-04-29 VITALS
DIASTOLIC BLOOD PRESSURE: 60 MMHG | BODY MASS INDEX: 23.79 KG/M2 | SYSTOLIC BLOOD PRESSURE: 112 MMHG | HEIGHT: 61 IN | HEART RATE: 66 BPM | WEIGHT: 126 LBS

## 2025-04-29 DIAGNOSIS — E78.2 MIXED HYPERLIPIDEMIA: ICD-10-CM

## 2025-04-29 DIAGNOSIS — Z78.9 STATIN INTOLERANCE: ICD-10-CM

## 2025-04-29 DIAGNOSIS — I25.10 ATHEROSCLEROSIS OF NATIVE CORONARY ARTERY OF NATIVE HEART WITHOUT ANGINA PECTORIS: ICD-10-CM

## 2025-04-29 DIAGNOSIS — I80.8: ICD-10-CM

## 2025-04-29 DIAGNOSIS — N18.4 STAGE 4 CHRONIC KIDNEY DISEASE (MULTI): ICD-10-CM

## 2025-04-29 DIAGNOSIS — D64.9 ANEMIA, UNSPECIFIED TYPE: Primary | ICD-10-CM

## 2025-04-29 DIAGNOSIS — R53.83 OTHER FATIGUE: ICD-10-CM

## 2025-04-29 DIAGNOSIS — Z87.891 FORMER SMOKER: ICD-10-CM

## 2025-04-29 DIAGNOSIS — I10 ESSENTIAL HYPERTENSION: ICD-10-CM

## 2025-04-29 PROBLEM — E66.3 OVERWEIGHT WITH BODY MASS INDEX (BMI) OF 26 TO 26.9 IN ADULT: Status: RESOLVED | Noted: 2023-09-28 | Resolved: 2025-04-29

## 2025-04-29 PROCEDURE — 99214 OFFICE O/P EST MOD 30 MIN: CPT | Performed by: INTERNAL MEDICINE

## 2025-04-29 PROCEDURE — 3074F SYST BP LT 130 MM HG: CPT | Performed by: INTERNAL MEDICINE

## 2025-04-29 PROCEDURE — 3078F DIAST BP <80 MM HG: CPT | Performed by: INTERNAL MEDICINE

## 2025-04-29 PROCEDURE — 1036F TOBACCO NON-USER: CPT | Performed by: INTERNAL MEDICINE

## 2025-04-29 PROCEDURE — 1159F MED LIST DOCD IN RCRD: CPT | Performed by: INTERNAL MEDICINE

## 2025-04-29 RX ORDER — IBUPROFEN 100 MG/5ML
1000 SUSPENSION, ORAL (FINAL DOSE FORM) ORAL DAILY
COMMUNITY

## 2025-04-29 RX ORDER — METHENAMINE HIPPURATE 1000 MG/1
1 TABLET ORAL 2 TIMES DAILY
COMMUNITY

## 2025-04-29 RX ORDER — INDAPAMIDE 1.25 MG/1
1.25 TABLET ORAL EVERY MORNING
COMMUNITY

## 2025-04-29 RX ORDER — ASPIRIN 81 MG/1
81 TABLET ORAL 3 TIMES WEEKLY
COMMUNITY

## 2025-04-29 ASSESSMENT — ENCOUNTER SYMPTOMS
LIGHT-HEADEDNESS: 1
SHORTNESS OF BREATH: 1

## 2025-04-29 NOTE — LETTER
April 29, 2025     Severiano Michaels DO  [address] [facility] [address] [facility]  [facility] [facility] [address]    Patient: Leigh Recih   YOB: 1940   Date of Visit: 4/29/2025       Dear Dr. Severiano Michaels, DO:    Thank you for referring Leigh Reich to me for evaluation. Below are my notes for this consultation.  If you have questions, please do not hesitate to call me. I look forward to following your patient along with you.       Sincerely,     Ayse Pereira MD      CC: No Recipients  ______________________________________________________________________________________    Chief Complaint   Patient presents with   • Follow-up     6 month Follow up for Coronary Artery Disease        Subjective   Leigh Reich is a 84 y.o. female     HPI   Patient is in the office for follow-up for CAD, hypertension and hyperlipidemia.  Back in December 2024 her lab data demonstrated significant anemia hemoglobin just over 8 g/dL and creatinine 1.8 mg/dL.  She was investigated and was not found to have any active bleeding but was attributed to recurrent UTI.  Blood work she had done 4 days ago revealed serum creatinine 1.8 mg/dL indicating stage IV chronic kidney disease which is a drop from her previous readings of stage IIIb.  Her hemoglobin is up to 10 g/dL.  She denies any hematuria or melena.  She recently developed significant hypotension leading to severe fatigue which improved by stopping the Lotrel.  She continues to be on indapamide 1.25 mg daily and spironolactone 25 mg daily.  She is on the baby aspirin.  She is only on ezetimibe with intolerance to statin therapy.  Reports no orthopnea PND lower extremity edema and no chest pain or need for nitroglycerin.  No palpitations    ASSESSMENT AND PLAN:      1. Mild coronary artery disease with no prior interventions, she had 50% stenosis in the LAD by cardiac catheterization 2012. Stress test 2017 was normal Continue risk factor modification. The patient has  "been compliant with medical therapy.    2.  Essential hypertension.  Due to low blood pressure readings recently the Lotrel was discontinued with improvement of symptoms of fatigue, continue indapamide and spironolactone  3. Hyperlipidemia, currently on Zetia, LDL over 100 mg/dL. She is intolerant to statin therapy and had reaction to the bempedoic acid with severe itching  4.  Stage IV chronic kidney disease, since the Lotrel was discontinued I expect improvement back to stage III chronic kidney disease will repeat basic metabolic profile in few weeks.  5. History of deep vein thrombosis in the past with no pulmonary embolism. There has been no recurrences off of anticoagulation.  6.  Essential tremor on propranolol with benefits and improvement  7-mild anemia with no active blood loss.  Improved by treating her recurrent UTI  8-recurrent UTI being treated by her PCP.  Review of Systems   Constitutional: Positive for malaise/fatigue.   Respiratory:  Positive for shortness of breath.    Neurological:  Positive for light-headedness.   All other systems reviewed and are negative.           Vitals:    04/29/25 0955   BP: 112/60   BP Location: Right arm   Patient Position: Sitting   Pulse: 66   Weight: 57.2 kg (126 lb)   Height: 1.549 m (5' 1\")        Objective   Physical Exam  Constitutional:       Appearance: Normal appearance.   HENT:      Nose: Nose normal.   Neck:      Vascular: No carotid bruit.   Cardiovascular:      Rate and Rhythm: Normal rate.      Pulses: Normal pulses.      Heart sounds: Normal heart sounds.   Pulmonary:      Effort: Pulmonary effort is normal.   Abdominal:      General: Bowel sounds are normal.      Palpations: Abdomen is soft.   Musculoskeletal:         General: Normal range of motion.      Cervical back: Normal range of motion.      Right lower leg: No edema.      Left lower leg: No edema.   Skin:     General: Skin is warm and dry.   Neurological:      General: No focal deficit present. "      Mental Status: She is alert.   Psychiatric:         Mood and Affect: Mood normal.         Behavior: Behavior normal.         Thought Content: Thought content normal.         Judgment: Judgment normal.         Allergies  Nitrofurantoin, Statins-hmg-coa reductase inhibitors, and Sulfamethoxazole     Current Medications  Current Outpatient Medications   Medication Instructions   • acetaminophen (Tylenol) 500 mg tablet 2 tablets, Every 8 hours PRN   • ascorbic acid (VITAMIN C) 1,000 mg, Daily   • aspirin 81 mg, 3 times weekly   • CALCIUM CARBONATE-VITAMIN D3 ORAL 1 tablet, Daily   • coenzyme Q-10 200 mg capsule 1 capsule, Daily   • ezetimibe (ZETIA) 10 mg, oral, Nightly   • indapamide (LOZOL) 1.25 mg, Every morning   • iron,carb/vit C/vit B12/folic (IRON 100 PLUS ORAL) 1 tablet, Daily   • loperamide (Imodium) 2 mg capsule 1 capsule, 4 times daily PRN   • magnesium oxide 500 mg magnesium tablet 2 times daily   • methenamine hippurate (HIPREX) 1 g, 2 times daily   • multivitamin/iron/folic acid (CENTRUM ORAL) 1 tablet, Daily   • nitroglycerin (Nitrostat) 0.4 mg SL tablet 1 tablet, Every 5 min PRN   • propranolol (INDERAL) 10 mg, 2 times daily   • spironolactone (ALDACTONE) 25 mg, oral, Daily   • vit A,C and E-lutein-minerals (Ocuvite with Lutein) 300 mcg-200 mg-27 mg-2 mg tablet 1 tablet, Daily   • VITAMIN K2 ORAL 1 tablet, Daily                        Assessment/Plan   1. Atherosclerosis of native coronary artery of native heart without angina pectoris  Follow Up In Cardiology      2. Essential hypertension        3. Mixed hyperlipidemia        4. Statin intolerance        5. Thrombophlebitis of deep vein of upper extremity        6. Stage 4 chronic kidney disease (Multi)        7. BMI 23.0-23.9, adult        8. Former smoker                 Scribe Attestation  By signing my name below, Roz RAMAN LPN  , Scribe   attest that this documentation has been prepared under the direction and in the presence of Ayse GARCES  MD Kelli.     Provider Attestation - Scribe documentation    All medical record entries made by the Scribe were at my direction and personally dictated by me. I have reviewed the chart and agree that the record accurately reflects my personal performance of the history, physical exam, discussion and plan.

## 2025-04-29 NOTE — PROGRESS NOTES
Chief Complaint   Patient presents with    Follow-up     6 month Follow up for Coronary Artery Disease        Subjective   Leigh Reich is a 84 y.o. female     HPI   Patient is in the office for follow-up for CAD, hypertension and hyperlipidemia.  Back in December 2024 her lab data demonstrated significant anemia hemoglobin just over 8 g/dL and creatinine 1.8 mg/dL.  She was investigated and was not found to have any active bleeding but was attributed to recurrent UTI.  Blood work she had done 4 days ago revealed serum creatinine 1.8 mg/dL indicating stage IV chronic kidney disease which is a drop from her previous readings of stage IIIb.  Her hemoglobin is up to 10 g/dL.  She denies any hematuria or melena.  She recently developed significant hypotension leading to severe fatigue which improved by stopping the Lotrel.  She continues to be on indapamide 1.25 mg daily and spironolactone 25 mg daily.  She is on the baby aspirin.  She is only on ezetimibe with intolerance to statin therapy.  Reports no orthopnea PND lower extremity edema and no chest pain or need for nitroglycerin.  No palpitations    ASSESSMENT AND PLAN:      1. Mild coronary artery disease with no prior interventions, she had 50% stenosis in the LAD by cardiac catheterization 2012. Stress test 2017 was normal Continue risk factor modification. The patient has been compliant with medical therapy.    2.  Essential hypertension.  Due to low blood pressure readings recently the Lotrel was discontinued with improvement of symptoms of fatigue, continue indapamide and spironolactone  3. Hyperlipidemia, currently on Zetia, LDL over 100 mg/dL. She is intolerant to statin therapy and had reaction to the bempedoic acid with severe itching  4.  Stage IV chronic kidney disease, since the Lotrel was discontinued I expect improvement back to stage III chronic kidney disease will repeat basic metabolic profile in few weeks.  5. History of deep vein thrombosis in  "the past with no pulmonary embolism. There has been no recurrences off of anticoagulation.  6.  Essential tremor on propranolol with benefits and improvement  7-mild anemia with no active blood loss.  Improved by treating her recurrent UTI  8-recurrent UTI being treated by her PCP.  Review of Systems   Constitutional: Positive for malaise/fatigue.   Respiratory:  Positive for shortness of breath.    Neurological:  Positive for light-headedness.   All other systems reviewed and are negative.           Vitals:    04/29/25 0955   BP: 112/60   BP Location: Right arm   Patient Position: Sitting   Pulse: 66   Weight: 57.2 kg (126 lb)   Height: 1.549 m (5' 1\")        Objective   Physical Exam  Constitutional:       Appearance: Normal appearance.   HENT:      Nose: Nose normal.   Neck:      Vascular: No carotid bruit.   Cardiovascular:      Rate and Rhythm: Normal rate.      Pulses: Normal pulses.      Heart sounds: Normal heart sounds.   Pulmonary:      Effort: Pulmonary effort is normal.   Abdominal:      General: Bowel sounds are normal.      Palpations: Abdomen is soft.   Musculoskeletal:         General: Normal range of motion.      Cervical back: Normal range of motion.      Right lower leg: No edema.      Left lower leg: No edema.   Skin:     General: Skin is warm and dry.   Neurological:      General: No focal deficit present.      Mental Status: She is alert.   Psychiatric:         Mood and Affect: Mood normal.         Behavior: Behavior normal.         Thought Content: Thought content normal.         Judgment: Judgment normal.         Allergies  Nitrofurantoin, Statins-hmg-coa reductase inhibitors, and Sulfamethoxazole     Current Medications  Current Outpatient Medications   Medication Instructions    acetaminophen (Tylenol) 500 mg tablet 2 tablets, Every 8 hours PRN    ascorbic acid (VITAMIN C) 1,000 mg, Daily    aspirin 81 mg, 3 times weekly    CALCIUM CARBONATE-VITAMIN D3 ORAL 1 tablet, Daily    coenzyme Q-10 " 200 mg capsule 1 capsule, Daily    ezetimibe (ZETIA) 10 mg, oral, Nightly    indapamide (LOZOL) 1.25 mg, Every morning    iron,carb/vit C/vit B12/folic (IRON 100 PLUS ORAL) 1 tablet, Daily    loperamide (Imodium) 2 mg capsule 1 capsule, 4 times daily PRN    magnesium oxide 500 mg magnesium tablet 2 times daily    methenamine hippurate (HIPREX) 1 g, 2 times daily    multivitamin/iron/folic acid (CENTRUM ORAL) 1 tablet, Daily    nitroglycerin (Nitrostat) 0.4 mg SL tablet 1 tablet, Every 5 min PRN    propranolol (INDERAL) 10 mg, 2 times daily    spironolactone (ALDACTONE) 25 mg, oral, Daily    vit A,C and E-lutein-minerals (Ocuvite with Lutein) 300 mcg-200 mg-27 mg-2 mg tablet 1 tablet, Daily    VITAMIN K2 ORAL 1 tablet, Daily                        Assessment/Plan   1. Atherosclerosis of native coronary artery of native heart without angina pectoris  Follow Up In Cardiology      2. Essential hypertension        3. Mixed hyperlipidemia        4. Statin intolerance        5. Thrombophlebitis of deep vein of upper extremity        6. Stage 4 chronic kidney disease (Multi)        7. BMI 23.0-23.9, adult        8. Former smoker                 Scribe Attestation  By signing my name below, I, Christina Bautista LPN   attest that this documentation has been prepared under the direction and in the presence of Ayse Pereira MD.     Provider Attestation - Scribe documentation    All medical record entries made by the Scribe were at my direction and personally dictated by me. I have reviewed the chart and agree that the record accurately reflects my personal performance of the history, physical exam, discussion and plan.

## 2025-05-05 DIAGNOSIS — I10 ESSENTIAL HYPERTENSION: ICD-10-CM

## 2025-05-05 RX ORDER — SPIRONOLACTONE 25 MG/1
25 TABLET ORAL DAILY
Qty: 90 TABLET | Refills: 3 | Status: SHIPPED | OUTPATIENT
Start: 2025-05-05 | End: 2026-05-05

## 2025-05-05 RX ORDER — INDAPAMIDE 1.25 MG/1
1.25 TABLET ORAL EVERY MORNING
Qty: 90 TABLET | Refills: 3 | Status: SHIPPED | OUTPATIENT
Start: 2025-05-05 | End: 2026-05-05

## 2025-12-17 ENCOUNTER — APPOINTMENT (OUTPATIENT)
Dept: CARDIOLOGY | Facility: CLINIC | Age: 85
End: 2025-12-17
Payer: MEDICARE